# Patient Record
Sex: FEMALE | Race: WHITE | URBAN - METROPOLITAN AREA
[De-identification: names, ages, dates, MRNs, and addresses within clinical notes are randomized per-mention and may not be internally consistent; named-entity substitution may affect disease eponyms.]

---

## 2021-03-31 DIAGNOSIS — Z23 ENCOUNTER FOR IMMUNIZATION: ICD-10-CM

## 2023-05-04 ENCOUNTER — TELEPHONE (OUTPATIENT)
Dept: OBSTETRICS AND GYNECOLOGY | Facility: CLINIC | Age: 28
End: 2023-05-04

## 2023-05-04 NOTE — TELEPHONE ENCOUNTER
The below request for an appointment was received in the Contact Center.  Please call patient to schedule.    REQUEST FOR PHYSICIAN APPOINTMENT:   Health Insurance:  [employer] -  Secondary Insurance:  [id number]  Physician Name:  Kelley Bond - Specialty: Gynecology  Reason for Appointment:  Hello, I'm 28 and have yet to be to a gynecologist so I just would like to check in and make sure everything is healthy!  Appointment Time Preference:  I'm available any time on May 5th (preferably closer to lunch), as well as any time May 10 or May [email or web address]  Email:  [email or web address]